# Patient Record
Sex: FEMALE | Race: WHITE | NOT HISPANIC OR LATINO | URBAN - METROPOLITAN AREA
[De-identification: names, ages, dates, MRNs, and addresses within clinical notes are randomized per-mention and may not be internally consistent; named-entity substitution may affect disease eponyms.]

---

## 2020-10-23 ENCOUNTER — OUTPATIENT (OUTPATIENT)
Dept: OUTPATIENT SERVICES | Facility: HOSPITAL | Age: 70
LOS: 1 days | End: 2020-10-23
Payer: MEDICARE

## 2020-10-23 ENCOUNTER — RESULT REVIEW (OUTPATIENT)
Age: 70
End: 2020-10-23

## 2020-10-23 ENCOUNTER — APPOINTMENT (OUTPATIENT)
Dept: ORTHOPEDIC SURGERY | Facility: CLINIC | Age: 70
End: 2020-10-23
Payer: MEDICARE

## 2020-10-23 VITALS
HEIGHT: 62 IN | OXYGEN SATURATION: 98 % | SYSTOLIC BLOOD PRESSURE: 110 MMHG | WEIGHT: 130 LBS | DIASTOLIC BLOOD PRESSURE: 70 MMHG | BODY MASS INDEX: 23.92 KG/M2 | HEART RATE: 76 BPM

## 2020-10-23 DIAGNOSIS — M25.552 PAIN IN LEFT HIP: ICD-10-CM

## 2020-10-23 DIAGNOSIS — M70.62 TROCHANTERIC BURSITIS, LEFT HIP: ICD-10-CM

## 2020-10-23 PROCEDURE — 20611 DRAIN/INJ JOINT/BURSA W/US: CPT | Mod: LT

## 2020-10-23 PROCEDURE — 99204 OFFICE O/P NEW MOD 45 MIN: CPT | Mod: 25

## 2020-10-23 PROCEDURE — 73521 X-RAY EXAM HIPS BI 2 VIEWS: CPT | Mod: 26

## 2020-10-23 PROCEDURE — 73521 X-RAY EXAM HIPS BI 2 VIEWS: CPT

## 2020-10-23 NOTE — PROCEDURE
[de-identified] : ULTRASOUND-GUIDED TROCHANTERIC BURSA INJECTION\par Indication for Ultrasound: To visualize trochanteric bursa and avoid damage to tendons and surrounding neurovascular structures\par \par Injection: L;EFT Hip.\par Indication: Trochanteric Bursitis.\par \par A discussion was had with the patient regarding this procedure and all questions were answered. All risks, benefits and alternatives were discussed. These included but were not limited to bleeding, infection, and allergic reaction. A timeout was done to ensure correct side and pt agreed to the procedure.  Alcohol was used to clean the skin, and betadine was used to sterilize and prep the area in the lateral aspect of the hip. The trochanteric bursa was visualized utilizing the Sonosite, linear transducer. The joint was visualized in the short axis and an in-plane approach was used for the injection. Ultrasound guidance was utilized to ensure accuracy of the injection.  A 25-gauge needle was used to inject 2cc of 1% lidocaine and 1cc of 40mg/ml methylprednisolone into the greater trochanteric bursa using a needling technique. A sterile bandage was then applied. The patient tolerated the procedure well and there were no complications.\par

## 2020-10-23 NOTE — PHYSICAL EXAM
[de-identified] : General: Well-nourished, well-developed, alert, and in no acute distress.\par Head: Normocephalic.\par Eyes: Pupils equal round reactive to light and accommodation, extraocular muscles intact, normal sclera.\par Nose: No nasal discharge.\par Cardiac: Regular rate. Extremities are warm and well perfused. Distal pulses are symmetric bilaterally.\par Respiratory: No labored breathing.\par Extremities: Sensation is intact distally bilaterally.  Distal pulses are symmetric bilaterally\par Lymphatic: No regional lymphadenopathy, no lymphedema\par Neurologic: No focal deficits\par Skin: Normal skin color, texture, and turgor\par Psychiatric: Normal affect\par MSK: as noted above/below\par \par \par \par LEFT HIP:\par \par Inspection: no bruising, erythema, rash, deformity \par Palpation: POSITIVEGreater Trochanter pain , POSITIVE ITB pain , NO Hip Flexor pain  , Piriformis pain , Proximal Hamstring Pain , Groin pain \par ROM: normal Internal Rotation , External Rotation\par Strength: 5/5 Hip Flexion, Hip Extension, Hip Abduction, Hip Adduction\par \par Distal Pulses: normal\par Lower Extremity Sensation: normal \par Patellar/Achilles Reflex: normal\par \par Special Tests:\par Stinchfield: NEGATIVE \par Log Roll: NEGATIVE\par FABERE: NEGATIVE\par FADIR: NEGATIVE\par Obers: POSITIVE\par \par RIGHT HIP:\par \par Inspection: no bruising, erythema, rash, deformity \par Palpation: No Greater Trochanter pain , ITB pain , Hip Flexor pain  , Piriformis pain , Proximal Hamstring Pain , Groin pain \par ROM: normal Internal Rotation , External Rotation\par Strength: 5/5 Hip Flexion, Hip Extension, Hip Abduction, Hip Adduction\par \par Distal Pulses: normal\par Lower Extremity Sensation: normal \par Patellar/Achilles Reflex: normal\par \par Special Tests:\par Stinchfield: NEGATIVE \par Log Roll: NEGATIVE\par FABERE: NEGATIVE\par FADIR: NEGATIVE\par \par \par

## 2020-10-23 NOTE — DISCUSSION/SUMMARY
[Medication Risks Reviewed] : Medication risks reviewed [de-identified] : The patient is a 70 year old woman s/p left TKA in 2019 presenting with a several week history of atraumatic, left lateral hip pain.  Her pain is likely secondary to trochanteric bursitis.\par \par The patient was counseled on the natural progression of the problem today.  The patient was provided reassurance today.\par \par Xray of pelvis/bilateral hips ordered to rule out underlying OA.\par \par After informed consent, and explanation of risks, benefits, alternatives, adverse effects of injection, which includes but is not limited to infection, bleeding, allergic reaction, swelling, soft tissue weakening/tendon rupture, injection site complication, fat atrophy, skin depigmentation, failure to improve symptoms, the patient would like to proceed with the procedure - LEFT GREATER TROCHANTERIC BURSA ULTRASOUND-GUIDED CORTICOSTEROID INJECTION. See procedure note above. Patient tolerated the procedure well. The patient was provided with postinjection instructions.\par \par The patient was also provided some general home exercises.  The patient was counseled on activity modification.\par \par Will follow-up with xray results.  RTC in 4-6 weeks if symptoms persist.  Consider MRI.\par \par ------------------------------------------------------------------------------------------------------------------\par Patient appreciates and agrees with current plan.\par \par This note was generated using a mixture of manual typing and dragon medical dictation software.  A reasonable effort has been made for proofreading its contents, but typos may still remain.  If there are any questions or points of clarification needed please notify my office.\par \par >45 minutes of time was spent on total encounter.  >50% of the visit was spent on counseling/coordination of care and medical-decision making for this patient.\par \par \par \par

## 2020-10-23 NOTE — HISTORY OF PRESENT ILLNESS
[de-identified] : The patient is a 70 year old woman s/p left TKA in 2019 presenting with left hip pain.\par \par The patient presents with a several week history of atraumatic, left lateral hip pain.  The patient denies precipitating injury or trauma.  She is s/p left TKA in 2019 at [facility] without complication.  She has restored left knee function, and did well in post-op PT.  She has been walking for exercise.  Towards the end of her walks, she has been feeling a tugging sensation on her left lateral hip.  She recalls being diagnosed with trochanteric bursitis in the past, and having a cortisone injection about 6-7 years ago.  She denies focal low back pain.  No previous imaging.  The patient denies distal numbness, weakness, paresthesias.\par \par Pain is rated 0/10 today, but when painful described as achy, improved with aleve, worse with walking up stairs. [0] : a current pain level of 0/10

## 2024-01-08 ENCOUNTER — APPOINTMENT (OUTPATIENT)
Dept: RADIOLOGY | Facility: CLINIC | Age: 74
End: 2024-01-08
Payer: MEDICARE

## 2024-01-08 ENCOUNTER — OUTPATIENT (OUTPATIENT)
Dept: OUTPATIENT SERVICES | Facility: HOSPITAL | Age: 74
LOS: 1 days | End: 2024-01-08

## 2024-01-09 PROCEDURE — 77080 DXA BONE DENSITY AXIAL: CPT | Mod: 26
